# Patient Record
Sex: FEMALE | Race: BLACK OR AFRICAN AMERICAN | URBAN - METROPOLITAN AREA
[De-identification: names, ages, dates, MRNs, and addresses within clinical notes are randomized per-mention and may not be internally consistent; named-entity substitution may affect disease eponyms.]

---

## 2024-10-03 ENCOUNTER — OFFICE VISIT (OUTPATIENT)
Dept: URBAN - METROPOLITAN AREA CLINIC 48 | Facility: CLINIC | Age: 59
End: 2024-10-03

## 2024-10-09 ENCOUNTER — DASHBOARD ENCOUNTERS (OUTPATIENT)
Age: 59
End: 2024-10-09

## 2024-10-09 ENCOUNTER — OFFICE VISIT (OUTPATIENT)
Dept: URBAN - METROPOLITAN AREA CLINIC 48 | Facility: CLINIC | Age: 59
End: 2024-10-09
Payer: OTHER GOVERNMENT

## 2024-10-09 VITALS
HEART RATE: 75 BPM | OXYGEN SATURATION: 94 % | TEMPERATURE: 98.1 F | DIASTOLIC BLOOD PRESSURE: 81 MMHG | WEIGHT: 267.4 LBS | BODY MASS INDEX: 38.28 KG/M2 | SYSTOLIC BLOOD PRESSURE: 131 MMHG | HEIGHT: 70 IN

## 2024-10-09 DIAGNOSIS — K21.9 GASTROESOPHAGEAL REFLUX DISEASE, UNSPECIFIED WHETHER ESOPHAGITIS PRESENT: ICD-10-CM

## 2024-10-09 DIAGNOSIS — Z12.11 COLON CANCER SCREENING: ICD-10-CM

## 2024-10-09 DIAGNOSIS — R05.8 OTHER COUGH: ICD-10-CM

## 2024-10-09 PROBLEM — 235595009: Status: ACTIVE | Noted: 2024-10-09

## 2024-10-09 PROCEDURE — 99204 OFFICE O/P NEW MOD 45 MIN: CPT | Performed by: INTERNAL MEDICINE

## 2024-10-09 RX ORDER — FLUTICASONE PROPIONATE 50 UG/1
1 SPRAY IN EACH NOSTRIL SPRAY, METERED NASAL ONCE A DAY
Status: ACTIVE | COMMUNITY

## 2024-10-09 RX ORDER — FAMOTIDINE 20 MG/1
1 TABLET AT BEDTIME AS NEEDED TABLET ORAL ONCE A DAY
OUTPATIENT

## 2024-10-09 RX ORDER — FAMOTIDINE 20 MG/1
1 TABLET AT BEDTIME AS NEEDED TABLET ORAL ONCE A DAY
Refills: 1 | Status: ACTIVE | COMMUNITY

## 2024-10-09 RX ORDER — OMEPRAZOLE 20 MG/1
1 CAPSULE 1/2 TO 1 HOUR BEFORE MORNING MEAL CAPSULE, DELAYED RELEASE ORAL ONCE A DAY
OUTPATIENT

## 2024-10-09 RX ORDER — OMEPRAZOLE 20 MG/1
1 CAPSULE 1/2 TO 1 HOUR BEFORE MORNING MEAL CAPSULE, DELAYED RELEASE ORAL ONCE A DAY
Refills: 1 | Status: ACTIVE | COMMUNITY

## 2024-10-09 RX ORDER — NAPROXEN 250 MG/1
1 TABLET WITH FOOD OR MILK AS NEEDED TABLET ORAL
Refills: 0 | Status: ON HOLD | COMMUNITY
Start: 1900-01-01

## 2024-10-09 RX ORDER — FLUOXETINE HYDROCHLORIDE 20 MG/1
1 CAPSULE CAPSULE ORAL ONCE A DAY
Refills: 0 | Status: ACTIVE | COMMUNITY

## 2024-10-09 RX ORDER — EZETIMIBE 10 MG/1
1 TABLET TABLET ORAL ONCE A DAY
Status: ACTIVE | COMMUNITY

## 2024-10-09 RX ORDER — LORATADINE 10 MG/1
1 TABLET TABLET ORAL ONCE A DAY
Refills: 0 | Status: ACTIVE | COMMUNITY
Start: 1900-01-01

## 2024-10-09 RX ORDER — LAMOTRIGINE 25 MG/1
1 TABLET TABLET ORAL
Refills: 0 | Status: ACTIVE | COMMUNITY

## 2024-10-09 RX ORDER — AMLODIPINE BESYLATE 5 MG/1
1 TABLET TABLET ORAL ONCE A DAY
Status: ACTIVE | COMMUNITY

## 2024-10-09 RX ORDER — BUPROPION HYDROCHLORIDE 150 MG/1
1 TABLET IN THE MORNING TABLET, EXTENDED RELEASE ORAL ONCE A DAY
Qty: 90 TABLET | Status: ACTIVE | COMMUNITY

## 2024-10-09 NOTE — HPI-TODAY'S VISIT:
60 y/o female is being referred by Dr. Brando Lopez. She has h/o DAVIS, compliant with CPAP, and asthma. She has been having issues with cough and ENT detected reflux laryngitis on recent laryngoscopy. EGD to be considered to evaluate for silent reflux which may be a vector for her recurrent episodes of protracted coughing and sinus issues. She states she was pu ton AM Omeprazole and PM Famotidine and had resolution in cough. She forgot her medications on a recent trip and dry/nagging cough has returned and she has also had some reflux with burning in the chest. She resumed acid suppression therapy yesterday. She has been having the intermittent coughing for the last year. She has had no prior EGD.   She had a normal colonoscopy 11/2016 for CRCS with 10 year follow up exam advised. She denies known family h/o CRC or polyps. She is having a formed/soft stool daily and denies blood in the stool.

## 2024-10-15 ENCOUNTER — CLAIMS CREATED FROM THE CLAIM WINDOW (OUTPATIENT)
Dept: URBAN - METROPOLITAN AREA CLINIC 4 | Facility: CLINIC | Age: 59
End: 2024-10-15
Payer: OTHER GOVERNMENT

## 2024-10-15 ENCOUNTER — CLAIMS CREATED FROM THE CLAIM WINDOW (OUTPATIENT)
Dept: URBAN - METROPOLITAN AREA SURGERY CENTER 28 | Facility: SURGERY CENTER | Age: 59
End: 2024-10-15
Payer: OTHER GOVERNMENT

## 2024-10-15 DIAGNOSIS — K21.9 ACID REFLUX DISEASE: ICD-10-CM

## 2024-10-15 DIAGNOSIS — K21.9 ACID REFLUX: ICD-10-CM

## 2024-10-15 DIAGNOSIS — B96.81 HELICOBACTER PYLORI [H. PYLORI] AS THE CAUSE OF DISEASES CLASSIFIED ELSEWHERE: ICD-10-CM

## 2024-10-15 DIAGNOSIS — K29.60 ADENOPAPILLOMATOSIS GASTRICA: ICD-10-CM

## 2024-10-15 DIAGNOSIS — K44.9 HIATAL HERNIA: ICD-10-CM

## 2024-10-15 DIAGNOSIS — B96.81 BACTERIAL INFECTION DUE TO H. PYLORI: ICD-10-CM

## 2024-10-15 DIAGNOSIS — K29.60 OTHER GASTRITIS WITHOUT BLEEDING: ICD-10-CM

## 2024-10-15 PROCEDURE — 43239 EGD BIOPSY SINGLE/MULTIPLE: CPT | Performed by: INTERNAL MEDICINE

## 2024-10-15 PROCEDURE — 88342 IMHCHEM/IMCYTCHM 1ST ANTB: CPT | Performed by: PATHOLOGY

## 2024-10-15 PROCEDURE — 00731 ANES UPR GI NDSC PX NOS: CPT | Performed by: NURSE ANESTHETIST, CERTIFIED REGISTERED

## 2024-10-15 PROCEDURE — 88305 TISSUE EXAM BY PATHOLOGIST: CPT | Performed by: PATHOLOGY

## 2024-10-15 RX ORDER — LAMOTRIGINE 25 MG/1
1 TABLET TABLET ORAL
Refills: 0 | Status: ACTIVE | COMMUNITY

## 2024-10-15 RX ORDER — FLUTICASONE PROPIONATE 50 UG/1
1 SPRAY IN EACH NOSTRIL SPRAY, METERED NASAL ONCE A DAY
Status: ACTIVE | COMMUNITY

## 2024-10-15 RX ORDER — BUPROPION HYDROCHLORIDE 150 MG/1
1 TABLET IN THE MORNING TABLET, EXTENDED RELEASE ORAL ONCE A DAY
Qty: 90 TABLET | Status: ACTIVE | COMMUNITY

## 2024-10-15 RX ORDER — LORATADINE 10 MG/1
1 TABLET TABLET ORAL ONCE A DAY
Refills: 0 | Status: ACTIVE | COMMUNITY
Start: 1900-01-01

## 2024-10-15 RX ORDER — EZETIMIBE 10 MG/1
1 TABLET TABLET ORAL ONCE A DAY
Status: ACTIVE | COMMUNITY

## 2024-10-15 RX ORDER — OMEPRAZOLE 20 MG/1
1 CAPSULE 1/2 TO 1 HOUR BEFORE MORNING MEAL CAPSULE, DELAYED RELEASE ORAL ONCE A DAY
Status: ACTIVE | COMMUNITY

## 2024-10-15 RX ORDER — FAMOTIDINE 20 MG/1
1 TABLET AT BEDTIME AS NEEDED TABLET ORAL ONCE A DAY
Status: ACTIVE | COMMUNITY

## 2024-10-15 RX ORDER — AMLODIPINE BESYLATE 5 MG/1
1 TABLET TABLET ORAL ONCE A DAY
Status: ACTIVE | COMMUNITY

## 2024-10-15 RX ORDER — FLUOXETINE HYDROCHLORIDE 20 MG/1
1 CAPSULE CAPSULE ORAL ONCE A DAY
Refills: 0 | Status: ACTIVE | COMMUNITY

## 2024-10-15 RX ORDER — NAPROXEN 250 MG/1
1 TABLET WITH FOOD OR MILK AS NEEDED TABLET ORAL
Refills: 0 | Status: ON HOLD | COMMUNITY
Start: 1900-01-01

## 2024-10-30 ENCOUNTER — TELEPHONE ENCOUNTER (OUTPATIENT)
Dept: URBAN - METROPOLITAN AREA CLINIC 44 | Facility: CLINIC | Age: 59
End: 2024-10-30

## 2024-10-30 RX ORDER — BISMUTH SUBSALICYLATE 262 MG/1
2 TABLETS EVERY 30 MINUTES TO 1 HOUR AS NEEDED TABLET, CHEWABLE ORAL
Qty: 112 | Refills: 0 | OUTPATIENT
Start: 2024-10-30 | End: 2024-11-12

## 2024-10-30 RX ORDER — METRONIDAZOLE 250 MG/1
1 TABLET TABLET ORAL
Qty: 56 TABLET | Refills: 0 | OUTPATIENT
Start: 2024-10-30 | End: 2024-11-12

## 2024-10-30 RX ORDER — OMEPRAZOLE 20 MG/1
1 CAPSULE 30 MINUTES BEFORE MORNING MEAL CAPSULE, DELAYED RELEASE ORAL TWICE A DAY
Qty: 28 TABLET | Refills: 0 | OUTPATIENT
Start: 2024-10-30

## 2024-10-30 RX ORDER — AZITHROMYCIN DIHYDRATE 500 MG/1
1 TABLET TABLET, FILM COATED ORAL TWICE DAILY
Qty: 28 | Refills: 0 | OUTPATIENT
Start: 2024-10-30 | End: 2024-11-12

## 2024-11-13 ENCOUNTER — TELEPHONE ENCOUNTER (OUTPATIENT)
Dept: URBAN - METROPOLITAN AREA CLINIC 48 | Facility: CLINIC | Age: 59
End: 2024-11-13

## 2024-11-13 RX ORDER — OMEPRAZOLE 20 MG/1
1 CAPSULE 30 MINUTES BEFORE MORNING MEAL CAPSULE, DELAYED RELEASE ORAL ONCE A DAY
Qty: 30 | Refills: 3
Start: 2024-10-30

## 2025-01-29 ENCOUNTER — OFFICE VISIT (OUTPATIENT)
Dept: URBAN - METROPOLITAN AREA CLINIC 48 | Facility: CLINIC | Age: 60
End: 2025-01-29
Payer: OTHER GOVERNMENT

## 2025-01-29 VITALS
HEART RATE: 80 BPM | TEMPERATURE: 97.3 F | SYSTOLIC BLOOD PRESSURE: 132 MMHG | BODY MASS INDEX: 38.65 KG/M2 | HEIGHT: 70 IN | DIASTOLIC BLOOD PRESSURE: 72 MMHG | WEIGHT: 270 LBS

## 2025-01-29 DIAGNOSIS — R19.8 ALTERED BOWEL FUNCTION: ICD-10-CM

## 2025-01-29 DIAGNOSIS — R14.0 ABDOMINAL BLOATING: ICD-10-CM

## 2025-01-29 DIAGNOSIS — K21.9 GASTROESOPHAGEAL REFLUX DISEASE WITHOUT ESOPHAGITIS: ICD-10-CM

## 2025-01-29 DIAGNOSIS — Z12.11 COLON CANCER SCREENING: ICD-10-CM

## 2025-01-29 DIAGNOSIS — A04.8 H. PYLORI INFECTION: ICD-10-CM

## 2025-01-29 PROBLEM — 266435005: Status: ACTIVE | Noted: 2025-01-29

## 2025-01-29 PROCEDURE — 99214 OFFICE O/P EST MOD 30 MIN: CPT | Performed by: INTERNAL MEDICINE

## 2025-01-29 RX ORDER — NAPROXEN 250 MG/1
1 TABLET WITH FOOD OR MILK AS NEEDED TABLET ORAL
Refills: 0 | COMMUNITY
Start: 1900-01-01

## 2025-01-29 RX ORDER — LAMOTRIGINE 25 MG/1
1 TABLET TABLET ORAL
Refills: 0 | Status: ACTIVE | COMMUNITY

## 2025-01-29 RX ORDER — FLUOXETINE HYDROCHLORIDE 20 MG/1
1 CAPSULE CAPSULE ORAL ONCE A DAY
Refills: 0 | Status: ACTIVE | COMMUNITY

## 2025-01-29 RX ORDER — FAMOTIDINE 20 MG/1
1 TABLET AT BEDTIME AS NEEDED TABLET ORAL ONCE A DAY
Status: ACTIVE | COMMUNITY

## 2025-01-29 RX ORDER — AMLODIPINE BESYLATE 5 MG/1
1 TABLET TABLET ORAL ONCE A DAY
Status: ACTIVE | COMMUNITY

## 2025-01-29 RX ORDER — OMEPRAZOLE 20 MG/1
1 CAPSULE 1/2 TO 1 HOUR BEFORE MORNING MEAL CAPSULE, DELAYED RELEASE ORAL ONCE A DAY
Status: ACTIVE | COMMUNITY

## 2025-01-29 RX ORDER — FLUTICASONE PROPIONATE 50 UG/1
1 SPRAY IN EACH NOSTRIL SPRAY, METERED NASAL ONCE A DAY
Status: ACTIVE | COMMUNITY

## 2025-01-29 RX ORDER — OMEPRAZOLE 20 MG/1
1 CAPSULE 30 MINUTES BEFORE MORNING MEAL CAPSULE, DELAYED RELEASE ORAL ONCE A DAY
Qty: 30 | Refills: 3 | Status: ACTIVE | COMMUNITY
Start: 2024-10-30

## 2025-01-29 RX ORDER — EZETIMIBE 10 MG/1
1 TABLET TABLET ORAL ONCE A DAY
Status: ACTIVE | COMMUNITY

## 2025-01-29 RX ORDER — BUPROPION HYDROCHLORIDE 150 MG/1
1 TABLET IN THE MORNING TABLET, EXTENDED RELEASE ORAL ONCE A DAY
Qty: 90 TABLET | Status: ACTIVE | COMMUNITY

## 2025-01-29 NOTE — HPI-TODAY'S VISIT:
58 y/o female was seen 10/2024 after being referred by Dr. Brando Lopez. She has h/o DAVIS, compliant with CPAP, and asthma. She has been having issues with cough and ENT detected reflux laryngitis on recent laryngoscopy. EGD to be considered to evaluate for silent reflux which may be a vector for her recurrent episodes of protracted coughing and sinus issues. She states she was put on AM Omeprazole and PM Famotidine and had resolution in cough. She forgot her medications on a recent trip and dry/nagging cough has returned and she has also had some reflux with burning in the chest. She resumed acid suppression therapy yesterday. She has been having the intermittent coughing for the last year.   EGD was done 10/15/24 with a small hiatal hernia. Gastric Bx was positive for H. pylori. She was treated with Omeprazole, Bismuth, Zithromax, Metronidazole. She has continued to take Omeprazole 20 mg in AM and Famotidine 20 mg PM with resolution in cough. She still c/o some abdominal bloating. She states in the last 2 months she is still  having only one stool per day, but they have been soft/loose, when previously stools were formed.   She had a normal colonoscopy 11/2016 for CRCS with 10 year follow up exam advised. She denies known family h/o CRC or polyps. She is having a formed/soft stool daily and denies blood in the stool.

## 2025-02-10 ENCOUNTER — TELEPHONE ENCOUNTER (OUTPATIENT)
Dept: URBAN - METROPOLITAN AREA CLINIC 46 | Facility: CLINIC | Age: 60
End: 2025-02-10

## 2025-02-13 ENCOUNTER — TELEPHONE ENCOUNTER (OUTPATIENT)
Dept: URBAN - METROPOLITAN AREA CLINIC 46 | Facility: CLINIC | Age: 60
End: 2025-02-13

## 2025-02-13 RX ORDER — VONOPRAZAN FUMARATE, AMOXICILLIN AND CLARITHROMYCIN 20-500-500
AS DIRECTED KIT ORAL TWICE DAILY
Qty: 1 PACK | Refills: 0 | OUTPATIENT
Start: 2025-02-13 | End: 2025-02-27

## 2025-02-18 ENCOUNTER — TELEPHONE ENCOUNTER (OUTPATIENT)
Dept: URBAN - METROPOLITAN AREA CLINIC 48 | Facility: CLINIC | Age: 60
End: 2025-02-18

## 2025-02-26 ENCOUNTER — OFFICE VISIT (OUTPATIENT)
Dept: URBAN - METROPOLITAN AREA CLINIC 48 | Facility: CLINIC | Age: 60
End: 2025-02-26

## 2025-03-26 ENCOUNTER — OFFICE VISIT (OUTPATIENT)
Dept: URBAN - METROPOLITAN AREA CLINIC 44 | Facility: CLINIC | Age: 60
End: 2025-03-26

## 2025-04-08 ENCOUNTER — TELEPHONE ENCOUNTER (OUTPATIENT)
Dept: URBAN - METROPOLITAN AREA CLINIC 48 | Facility: CLINIC | Age: 60
End: 2025-04-08

## 2025-05-19 ENCOUNTER — TELEPHONE ENCOUNTER (OUTPATIENT)
Dept: URBAN - METROPOLITAN AREA CLINIC 48 | Facility: CLINIC | Age: 60
End: 2025-05-19

## 2025-06-05 ENCOUNTER — TELEPHONE ENCOUNTER (OUTPATIENT)
Dept: URBAN - METROPOLITAN AREA CLINIC 46 | Facility: CLINIC | Age: 60
End: 2025-06-05

## 2025-06-05 RX ORDER — VONOPRAZAN FUMARATE, AMOXICILLIN AND CLARITHROMYCIN 20-500-500
AS DIRECTED KIT ORAL
Qty: 1 PACK | Refills: 0 | OUTPATIENT
Start: 2025-06-05 | End: 2025-06-19

## 2025-06-10 ENCOUNTER — TELEPHONE ENCOUNTER (OUTPATIENT)
Dept: URBAN - METROPOLITAN AREA CLINIC 46 | Facility: CLINIC | Age: 60
End: 2025-06-10

## 2025-06-23 ENCOUNTER — TELEPHONE ENCOUNTER (OUTPATIENT)
Dept: URBAN - METROPOLITAN AREA CLINIC 46 | Facility: CLINIC | Age: 60
End: 2025-06-23

## 2025-06-25 ENCOUNTER — OFFICE VISIT (OUTPATIENT)
Dept: URBAN - METROPOLITAN AREA MEDICAL CENTER 35 | Facility: MEDICAL CENTER | Age: 60
End: 2025-06-25

## 2025-06-25 RX ORDER — FAMOTIDINE 20 MG/1
1 TABLET AT BEDTIME AS NEEDED TABLET ORAL ONCE A DAY
Status: ACTIVE | COMMUNITY

## 2025-06-25 RX ORDER — LAMOTRIGINE 25 MG/1
1 TABLET TABLET ORAL
Refills: 0 | Status: ACTIVE | COMMUNITY

## 2025-06-25 RX ORDER — FLUOXETINE HYDROCHLORIDE 20 MG/1
1 CAPSULE CAPSULE ORAL ONCE A DAY
Refills: 0 | Status: ACTIVE | COMMUNITY

## 2025-06-25 RX ORDER — EZETIMIBE 10 MG/1
1 TABLET TABLET ORAL ONCE A DAY
Status: ACTIVE | COMMUNITY

## 2025-06-25 RX ORDER — AMLODIPINE BESYLATE 5 MG/1
1 TABLET TABLET ORAL ONCE A DAY
Status: ACTIVE | COMMUNITY

## 2025-06-25 RX ORDER — NAPROXEN 250 MG/1
1 TABLET WITH FOOD OR MILK AS NEEDED TABLET ORAL
Refills: 0 | COMMUNITY
Start: 1900-01-01

## 2025-06-25 RX ORDER — OMEPRAZOLE 20 MG/1
1 CAPSULE 30 MINUTES BEFORE MORNING MEAL CAPSULE, DELAYED RELEASE ORAL ONCE A DAY
Qty: 30 | Refills: 3 | Status: ACTIVE | COMMUNITY
Start: 2024-10-30

## 2025-06-25 RX ORDER — BUPROPION HYDROCHLORIDE 150 MG/1
1 TABLET IN THE MORNING TABLET, EXTENDED RELEASE ORAL ONCE A DAY
Qty: 90 TABLET | Status: ACTIVE | COMMUNITY

## 2025-06-25 RX ORDER — FLUTICASONE PROPIONATE 50 UG/1
1 SPRAY IN EACH NOSTRIL SPRAY, METERED NASAL ONCE A DAY
Status: ACTIVE | COMMUNITY

## 2025-06-25 RX ORDER — OMEPRAZOLE 20 MG/1
1 CAPSULE 1/2 TO 1 HOUR BEFORE MORNING MEAL CAPSULE, DELAYED RELEASE ORAL ONCE A DAY
Status: ACTIVE | COMMUNITY

## 2025-07-08 ENCOUNTER — TELEPHONE ENCOUNTER (OUTPATIENT)
Dept: URBAN - METROPOLITAN AREA CLINIC 44 | Facility: CLINIC | Age: 60
End: 2025-07-08

## 2025-07-08 ENCOUNTER — OFFICE VISIT (OUTPATIENT)
Dept: URBAN - METROPOLITAN AREA MEDICAL CENTER 35 | Facility: MEDICAL CENTER | Age: 60
End: 2025-07-08
Payer: OTHER GOVERNMENT

## 2025-07-08 DIAGNOSIS — K21.9 ACID REFLUX: ICD-10-CM

## 2025-07-08 DIAGNOSIS — B96.81 BACTERIAL INFECTION DUE TO H. PYLORI: ICD-10-CM

## 2025-07-08 DIAGNOSIS — K29.60 OTHER GASTRITIS WITHOUT BLEEDING: ICD-10-CM

## 2025-07-08 PROCEDURE — 43239 EGD BIOPSY SINGLE/MULTIPLE: CPT | Performed by: INTERNAL MEDICINE

## 2025-07-08 RX ORDER — LAMOTRIGINE 25 MG/1
1 TABLET TABLET ORAL
Refills: 0 | Status: ACTIVE | COMMUNITY

## 2025-07-08 RX ORDER — FAMOTIDINE 20 MG/1
1 TABLET AT BEDTIME AS NEEDED TABLET ORAL ONCE A DAY
Status: ACTIVE | COMMUNITY

## 2025-07-08 RX ORDER — OMEPRAZOLE 20 MG/1
1 CAPSULE 1/2 TO 1 HOUR BEFORE MORNING MEAL CAPSULE, DELAYED RELEASE ORAL ONCE A DAY
Status: ACTIVE | COMMUNITY

## 2025-07-08 RX ORDER — VONOPRAZAN FUMARATE 26.72 MG/1
1 TABLET TABLET ORAL ONCE A DAY
Qty: 30 | OUTPATIENT
Start: 2025-07-08 | End: 2025-08-07

## 2025-07-08 RX ORDER — FLUTICASONE PROPIONATE 50 UG/1
1 SPRAY IN EACH NOSTRIL SPRAY, METERED NASAL ONCE A DAY
Status: ACTIVE | COMMUNITY

## 2025-07-08 RX ORDER — OMEPRAZOLE 20 MG/1
1 CAPSULE 30 MINUTES BEFORE MORNING MEAL CAPSULE, DELAYED RELEASE ORAL ONCE A DAY
Qty: 30 | Refills: 3 | Status: ACTIVE | COMMUNITY
Start: 2024-10-30

## 2025-07-08 RX ORDER — EZETIMIBE 10 MG/1
1 TABLET TABLET ORAL ONCE A DAY
Status: ACTIVE | COMMUNITY

## 2025-07-08 RX ORDER — NAPROXEN 250 MG/1
1 TABLET WITH FOOD OR MILK AS NEEDED TABLET ORAL
Refills: 0 | COMMUNITY
Start: 1900-01-01

## 2025-07-08 RX ORDER — BUPROPION HYDROCHLORIDE 150 MG/1
1 TABLET IN THE MORNING TABLET, EXTENDED RELEASE ORAL ONCE A DAY
Qty: 90 TABLET | Status: ACTIVE | COMMUNITY

## 2025-07-08 RX ORDER — AMLODIPINE BESYLATE 5 MG/1
1 TABLET TABLET ORAL ONCE A DAY
Status: ACTIVE | COMMUNITY

## 2025-07-08 RX ORDER — FLUOXETINE HYDROCHLORIDE 20 MG/1
1 CAPSULE CAPSULE ORAL ONCE A DAY
Refills: 0 | Status: ACTIVE | COMMUNITY

## 2025-07-09 ENCOUNTER — TELEPHONE ENCOUNTER (OUTPATIENT)
Dept: URBAN - METROPOLITAN AREA CLINIC 44 | Facility: CLINIC | Age: 60
End: 2025-07-09

## 2025-07-09 ENCOUNTER — TELEPHONE ENCOUNTER (OUTPATIENT)
Dept: URBAN - METROPOLITAN AREA CLINIC 48 | Facility: CLINIC | Age: 60
End: 2025-07-09

## 2025-07-22 PROBLEM — 8493009: Status: ACTIVE | Noted: 2025-07-22

## 2025-08-12 ENCOUNTER — OFFICE VISIT (OUTPATIENT)
Dept: URBAN - METROPOLITAN AREA MEDICAL CENTER 35 | Facility: MEDICAL CENTER | Age: 60
End: 2025-08-12